# Patient Record
Sex: MALE | Race: ASIAN | NOT HISPANIC OR LATINO | URBAN - METROPOLITAN AREA
[De-identification: names, ages, dates, MRNs, and addresses within clinical notes are randomized per-mention and may not be internally consistent; named-entity substitution may affect disease eponyms.]

---

## 2021-08-11 ENCOUNTER — EMERGENCY (EMERGENCY)
Facility: HOSPITAL | Age: 26
LOS: 1 days | Discharge: HOME | End: 2021-08-11
Attending: EMERGENCY MEDICINE | Admitting: EMERGENCY MEDICINE
Payer: MEDICAID

## 2021-08-11 VITALS
RESPIRATION RATE: 15 BRPM | OXYGEN SATURATION: 100 % | DIASTOLIC BLOOD PRESSURE: 62 MMHG | TEMPERATURE: 98 F | HEART RATE: 71 BPM | SYSTOLIC BLOOD PRESSURE: 113 MMHG

## 2021-08-11 VITALS
TEMPERATURE: 98 F | DIASTOLIC BLOOD PRESSURE: 65 MMHG | RESPIRATION RATE: 16 BRPM | OXYGEN SATURATION: 99 % | HEART RATE: 50 BPM | SYSTOLIC BLOOD PRESSURE: 104 MMHG

## 2021-08-11 PROCEDURE — 71046 X-RAY EXAM CHEST 2 VIEWS: CPT | Mod: 26

## 2021-08-11 PROCEDURE — 93010 ELECTROCARDIOGRAM REPORT: CPT

## 2021-08-11 PROCEDURE — 99284 EMERGENCY DEPT VISIT MOD MDM: CPT | Mod: 25

## 2021-08-11 RX ORDER — ACETAMINOPHEN 500 MG
650 TABLET ORAL ONCE
Refills: 0 | Status: COMPLETED | OUTPATIENT
Start: 2021-08-11 | End: 2021-08-11

## 2021-08-11 RX ORDER — DIPHENHYDRAMINE HCL 50 MG
50 CAPSULE ORAL ONCE
Refills: 0 | Status: COMPLETED | OUTPATIENT
Start: 2021-08-11 | End: 2021-08-11

## 2021-08-11 RX ADMIN — Medication 50 MILLIGRAM(S): at 09:49

## 2021-08-11 RX ADMIN — Medication 650 MILLIGRAM(S): at 08:03
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
## 2021-08-11 NOTE — ED PROVIDER NOTE - PHYSICAL EXAMINATION
mild TTP right sided chest wall, no swelling, no ecchymosis, no deformity.   Face: no swelling, no deformity noted.

## 2021-08-11 NOTE — ED PROVIDER NOTE - PROGRESS NOTE DETAILS
CARA Thorne: alerted by nursing pt developing rash to palms, back and ears.  Pt evaluated hives noted to back, erythema noted to palms and ears, oropharynx clear no swelling noted.  Benadryl ordered, likely reaction to Tylenol.  Pt states he has taken Tylenol in the past with no reaction.  Will continue to monitor. CARA Thorne: pt feels better rash resolved, ambulating without difficulty.  Results reviewed with patient.  Discharge reviewed and discussed with patient.  Pt states a friend is going to pick him up. CARA Thorne: alerted by nursing pt developing rash to palms, back and ears.  Pt evaluated hives noted to back, erythema noted to palms and ears, oropharynx clear no swelling noted.  Benadryl ordered, likely reaction to Tylenol.  Pt states he has taken Tylenol in the past with no reaction.  Will continue to monitor.  Pt advised not to take Tylenol.
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
## 2021-08-11 NOTE — ED PROVIDER NOTE - NSFOLLOWUPINSTRUCTIONS_ED_ALL_ED_FT
Follow up with your Primary Medical Doctor in 1-2 days.  Return to the ER for any persistent/worsening or new symptoms weakness, dizziness, chest pain, shortness of breath or any concerning symptoms.
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
## 2021-08-11 NOTE — ED PROVIDER NOTE - PATIENT PORTAL LINK FT
You can access the FollowMyHealth Patient Portal offered by [facility] by registering at the following website: [email or web address]. By joining [facility]’s FollowMyHealth portal, you will also be able to view your health information using other applications (apps) compatible with our system.

## 2024-07-12 NOTE — ED PROVIDER NOTE - NSICDXPASTSURGICALHX_GEN_ALL_CORE_FT
[facility]  Emergency Medicine Department    Pt Name: Bassem De Jesus  MRN: [id number]  Birthdate 
PAST SURGICAL HISTORY:  No significant past surgical history